# Patient Record
Sex: MALE | Race: WHITE | Employment: PART TIME | URBAN - METROPOLITAN AREA
[De-identification: names, ages, dates, MRNs, and addresses within clinical notes are randomized per-mention and may not be internally consistent; named-entity substitution may affect disease eponyms.]

---

## 2017-08-08 ENCOUNTER — OFFICE VISIT (OUTPATIENT)
Dept: FAMILY MEDICINE CLINIC | Facility: CLINIC | Age: 62
End: 2017-08-08

## 2017-08-08 VITALS
HEART RATE: 67 BPM | DIASTOLIC BLOOD PRESSURE: 70 MMHG | RESPIRATION RATE: 14 BRPM | WEIGHT: 205 LBS | HEIGHT: 72 IN | OXYGEN SATURATION: 98 % | TEMPERATURE: 98 F | SYSTOLIC BLOOD PRESSURE: 110 MMHG | BODY MASS INDEX: 27.77 KG/M2

## 2017-08-08 DIAGNOSIS — H66.92 ACUTE OTITIS MEDIA, LEFT: Primary | ICD-10-CM

## 2017-08-08 DIAGNOSIS — T50.905A ADVERSE DRUG REACTION, INITIAL ENCOUNTER: ICD-10-CM

## 2017-08-08 PROCEDURE — 99202 OFFICE O/P NEW SF 15 MIN: CPT | Performed by: PHYSICIAN ASSISTANT

## 2017-08-08 RX ORDER — AZITHROMYCIN 250 MG/1
TABLET, FILM COATED ORAL
Qty: 6 TABLET | Refills: 0 | Status: SHIPPED | OUTPATIENT
Start: 2017-08-08 | End: 2017-08-29

## 2017-08-08 RX ORDER — AMOXICILLIN AND CLAVULANATE POTASSIUM 875; 125 MG/1; MG/1
TABLET, FILM COATED ORAL
COMMUNITY
Start: 2017-08-04 | End: 2017-08-29

## 2017-08-08 NOTE — PROGRESS NOTES
CHIEF COMPLAINT:   Patient presents with:  Ear Pain: and allergic reaction to Augmentin, last night vomitting, over night 2 hours after taking Augmentin.  Fri started Augmentin     HPI:   Ivonne Nowak is a 65year old male who presents to clinic toda effusion, bony landmarks sharp. NOSE: nostrils patent, no nasal discharge, nasal mucosa pink and noninflamed  THROAT: oral mucosa pink, moist. Posterior pharynx is not erythematous or injected. No exudates.   NECK: supple, non-tender  LUNGS: clear to auscu (Otitis Media)   What is a middle ear infection? A middle ear infection is an infection of the air-filled space in the ear behind the eardrum. Anyone can get an ear infection, but ear infections are more common in children less than 6years old.    How do to help clear the eustachian tube. This may help relieve pressure in the middle ear. For pain take a nonprescription pain reliever such as acetaminophen (Tylenol) or ibuprofen.  Carefully follow the directions for using medicines, even if they are nonprescr even after you take acetaminophen, aspirin, or ibuprofen   a severe headache or worsening pain around the ear   swelling around the ear   increasing dizziness   worsening of your hearing   weakness of one side of your face. Keep all your appointments.  Maryann Osman

## 2017-08-08 NOTE — PATIENT INSTRUCTIONS
Please follow up with your PCP if no improvement within 5-7 days. Go directly to the ER for any acute worsening of symptoms. Discontinue Augmentin  · It is very important to complete full course of antibiotic.    · Acetaminophen or ibuprofen according to not move well. How is it treated? Antibiotic medicine is a common treatment for ear infections. However, recent studies have shown that the symptoms of ear infections often go away in a couple of days without antibiotics.  Bacteria can become resistant of your eardrum. If you have a fever:   Rest until your temperature has fallen below 100°F (37.8°C). Then become as active as is comfortable. Ask your provider if you can take aspirin, acetaminophen, or ibuprofen to control your fever.  Anyone under the

## 2017-08-29 PROBLEM — M54.2 NECK PAIN: Status: ACTIVE | Noted: 2017-08-29

## 2017-08-29 PROBLEM — M54.12 CERVICAL RADICULOPATHY: Status: ACTIVE | Noted: 2017-08-29

## 2017-08-29 PROBLEM — M50.30 DEGENERATIVE CERVICAL DISC: Status: ACTIVE | Noted: 2017-08-29

## 2017-12-08 ENCOUNTER — HOSPITAL ENCOUNTER (OUTPATIENT)
Age: 62
Discharge: HOME | End: 2017-12-08
Payer: COMMERCIAL

## 2017-12-08 ENCOUNTER — OFFICE VISIT (OUTPATIENT)
Dept: FAMILY MEDICINE CLINIC | Facility: CLINIC | Age: 62
End: 2017-12-08

## 2017-12-08 ENCOUNTER — APPOINTMENT (OUTPATIENT)
Dept: CT IMAGING | Age: 62
End: 2017-12-08
Attending: NURSE PRACTITIONER
Payer: COMMERCIAL

## 2017-12-08 VITALS
TEMPERATURE: 98 F | DIASTOLIC BLOOD PRESSURE: 84 MMHG | HEART RATE: 60 BPM | RESPIRATION RATE: 16 BRPM | OXYGEN SATURATION: 98 % | SYSTOLIC BLOOD PRESSURE: 172 MMHG

## 2017-12-08 VITALS
RESPIRATION RATE: 20 BRPM | WEIGHT: 205 LBS | HEART RATE: 54 BPM | BODY MASS INDEX: 27.77 KG/M2 | TEMPERATURE: 97 F | OXYGEN SATURATION: 96 % | SYSTOLIC BLOOD PRESSURE: 180 MMHG | HEIGHT: 72 IN | DIASTOLIC BLOOD PRESSURE: 75 MMHG

## 2017-12-08 DIAGNOSIS — Z02.9 ENCOUNTERS FOR ADMINISTRATIVE PURPOSE: Primary | ICD-10-CM

## 2017-12-08 DIAGNOSIS — R10.31 RIGHT LOWER QUADRANT ABDOMINAL PAIN: ICD-10-CM

## 2017-12-08 DIAGNOSIS — K57.92 ACUTE DIVERTICULITIS: ICD-10-CM

## 2017-12-08 DIAGNOSIS — N13.39 OTHER HYDRONEPHROSIS: Primary | ICD-10-CM

## 2017-12-08 PROCEDURE — 80047 BASIC METABLC PNL IONIZED CA: CPT

## 2017-12-08 PROCEDURE — 74176 CT ABD & PELVIS W/O CONTRAST: CPT | Performed by: NURSE PRACTITIONER

## 2017-12-08 PROCEDURE — 87086 URINE CULTURE/COLONY COUNT: CPT | Performed by: NURSE PRACTITIONER

## 2017-12-08 PROCEDURE — 81002 URINALYSIS NONAUTO W/O SCOPE: CPT | Performed by: NURSE PRACTITIONER

## 2017-12-08 PROCEDURE — 96360 HYDRATION IV INFUSION INIT: CPT

## 2017-12-08 PROCEDURE — 99204 OFFICE O/P NEW MOD 45 MIN: CPT

## 2017-12-08 PROCEDURE — 99214 OFFICE O/P EST MOD 30 MIN: CPT

## 2017-12-08 PROCEDURE — 85025 COMPLETE CBC W/AUTO DIFF WBC: CPT | Performed by: NURSE PRACTITIONER

## 2017-12-08 RX ORDER — CIPROFLOXACIN 500 MG/1
500 TABLET, FILM COATED ORAL 2 TIMES DAILY
Qty: 20 TABLET | Refills: 0 | Status: SHIPPED | OUTPATIENT
Start: 2017-12-08 | End: 2017-12-18

## 2017-12-08 RX ORDER — METRONIDAZOLE 500 MG/1
500 TABLET ORAL 3 TIMES DAILY
Qty: 30 TABLET | Refills: 0 | Status: SHIPPED | OUTPATIENT
Start: 2017-12-08 | End: 2017-12-18

## 2017-12-08 RX ORDER — SODIUM CHLORIDE 9 MG/ML
1000 INJECTION, SOLUTION INTRAVENOUS ONCE
Status: COMPLETED | OUTPATIENT
Start: 2017-12-08 | End: 2017-12-08

## 2017-12-08 NOTE — PROGRESS NOTES
65year old smoker presents with 1 week history of lower abdominal discomfort. Localizes symptoms more to the right lower quadrant and central lower pelvis. Denies fever, N/V.  BM per usual without blood. No abdominal surgical  History.  No urinary symp

## 2017-12-09 NOTE — ED INITIAL ASSESSMENT (HPI)
Patient has had lower right back pain that moves to the front. He was having RLQ pain that is now more of a central lower abdomen pain. No fevers.  No nausea, no vomiting, no history of prostate issues, no history of kidney stones but 2 of his brothers have

## 2017-12-09 NOTE — ED PROVIDER NOTES
Patient Seen in: [id number] [facility]    History   Patient presents with:  Abdomen/Flank Pain (GI/)    Stated Complaint: stomach pain     28-year-old male presents today with complaints of right lower quadrant and inguinal pain.   Pain starte Device: None (Room air)    Current:BP (!) 180/75   Pulse 54   Temp (!) 96.8 °F (36 °C) (Temporal)   Resp 20   Ht 182.9 cm (6')   Wt 93 kg   SpO2 96%   BMI 27.80 kg/m²         Physical Exam   Constitutional: He is oriented to person, place, and time.  He yi Dose reduction techniques were used. Dose information is transmitted to the [facility] [employer]) NRDR ([address]) which includes the Dose Index Registry.   PATIENT STATED HISTORY: (As transcribed by Technologist)  Lucie Patel hydroureter however no stone observed. Skin also shows some mild focal sigmoid diverticulitis. Discussed results with patient. Patient follow-up with urology Dr. Tanya Stallings for further care and evaluation of hydronephrosis.   Patient instructed to follow with p

## 2017-12-20 PROBLEM — I70.0 AORTIC CALCIFICATION (HCC): Status: ACTIVE | Noted: 2017-12-20

## 2017-12-20 PROBLEM — I70.0 AORTIC CALCIFICATION (HCC): Status: RESOLVED | Noted: 2017-12-20 | Resolved: 2017-12-20

## 2017-12-20 PROBLEM — I70.0 CALCIFICATION OF ABDOMINAL AORTA (HCC): Status: ACTIVE | Noted: 2017-12-20

## 2018-01-09 PROBLEM — M47.812 SPONDYLOSIS OF CERVICAL REGION WITHOUT MYELOPATHY OR RADICULOPATHY: Status: ACTIVE | Noted: 2018-01-09

## 2018-01-09 PROBLEM — M54.12 CERVICAL RADICULITIS: Status: ACTIVE | Noted: 2018-01-09

## 2018-01-17 NOTE — PROGRESS NOTES
CC:  Patient presents with:  Smoking: pt follow up on chantix, pt has cut down to 10 cigarettes daily      HPI: Luke Chen presents for a 4-week follow up after starting Chantix, as well as to recheck his previously-elevated BP.  He was seen a week ago in the distress  HENT:  Head: Normocephalic, atraumatic  Ears: Normal external ears  Nose: No edema, bleeding, or rhinorrhea  Eyes: Pupils equal  Cardiovascular: Excellent peripheral perfusion  Pulmonary/Chest: No audible wheezing or labored breathing  Abdominal: Varenicline Tartrate (CHANTIX) 1 MG Oral Tab 60 tablet 1      Sig: Take 1 tablet (1 mg total) by mouth 2 (two) times daily.

## 2018-03-15 NOTE — PROGRESS NOTES
CC:  Patient presents with:  Diverticulitis: follow up after completing abx, stools are still soft and experiencing some minor lower abdomen pain  Blood Pressure: blood pressure has been elevated in the last few weeks    Smoking: ct scan discovered aortic nourished; in no acute distress  HENT:  Head: Normocephalic, atraumatic  Ears: Normal external ears  Nose: No edema, bleeding, or rhinorrhea  Eyes: Pupils equal  Cardiovascular: Excellent peripheral perfusion  Pulmonary/Chest: No audible wheezing or labore Sig: Take 0.5 mg by mouth 2 (two) times daily. Take 0.5 mg twice daily days 1-7, then 1 mg twice daily after that. Airway patent, Nasal mucosa clear. Mouth with normal mucosa. Throat has no vesicles, no oropharyngeal exudates and uvula is midline.

## 2018-04-02 ENCOUNTER — OFFICE VISIT (OUTPATIENT)
Dept: FAMILY MEDICINE CLINIC | Facility: CLINIC | Age: 63
End: 2018-04-02

## 2018-04-02 VITALS
TEMPERATURE: 99 F | WEIGHT: 217 LBS | RESPIRATION RATE: 16 BRPM | HEIGHT: 71.5 IN | HEART RATE: 84 BPM | DIASTOLIC BLOOD PRESSURE: 70 MMHG | BODY MASS INDEX: 29.72 KG/M2 | SYSTOLIC BLOOD PRESSURE: 120 MMHG

## 2018-04-02 DIAGNOSIS — Z71.6 ENCOUNTER FOR SMOKING CESSATION COUNSELING: ICD-10-CM

## 2018-04-02 DIAGNOSIS — Z00.00 ROUTINE GENERAL MEDICAL EXAMINATION AT A HEALTH CARE FACILITY: Primary | ICD-10-CM

## 2018-04-02 PROCEDURE — 99396 PREV VISIT EST AGE 40-64: CPT | Performed by: PHYSICIAN ASSISTANT

## 2018-04-02 NOTE — PROGRESS NOTES
Bettyjane Dakin is a 65year old male who presents for a complete physical exam. He has no complaints. He has a home exercise program for his left shoulder pain following PT, but is doing well otherwise.  He has cut down to no cigarettes somedays, and 1-2 on his wo palpitations, tachycardia, or arrythmias  GI: Denies abdominal pain, constipation or diarrhea; no N/V  : Denies nocturia or changes in stream; no testicular pain, edema, or lumps  Neuro: Denies headaches; dizziness, syncope; no weakness, numbness, or tin

## 2018-04-16 RX ORDER — VARENICLINE TARTRATE 1 MG/1
1 TABLET, FILM COATED ORAL 2 TIMES DAILY
Qty: 60 TABLET | Refills: 1 | Status: SHIPPED | OUTPATIENT
Start: 2018-04-16 | End: 2018-06-18

## 2018-04-16 NOTE — TELEPHONE ENCOUNTER
Spoke to pt who's requesting refill on Varenicline Tartrate (CHANTIX) 1 MG Oral be sent to [address] [place] [address]. Pt will be out of meds on Wed 4/18/18 and will be out of town on Thursday, 4/19/18.

## 2018-04-23 ENCOUNTER — HOSPITAL ENCOUNTER (OUTPATIENT)
Dept: CT IMAGING | Facility: HOSPITAL | Age: 63
Discharge: HOME | End: 2018-04-23
Attending: STUDENT IN AN ORGANIZED HEALTH CARE EDUCATION/TRAINING PROGRAM
Payer: COMMERCIAL

## 2018-04-23 DIAGNOSIS — K64.9 RECTAL VARICES: ICD-10-CM

## 2018-04-23 PROCEDURE — 74177 CT ABD & PELVIS W/CONTRAST: CPT | Performed by: STUDENT IN AN ORGANIZED HEALTH CARE EDUCATION/TRAINING PROGRAM

## 2018-04-23 PROCEDURE — 82565 ASSAY OF CREATININE: CPT

## 2018-05-07 ENCOUNTER — TELEPHONE (OUTPATIENT)
Dept: FAMILY MEDICINE CLINIC | Facility: CLINIC | Age: 63
End: 2018-05-07

## 2018-05-07 DIAGNOSIS — Z13.220 SCREENING, LIPID: ICD-10-CM

## 2018-05-07 DIAGNOSIS — Z12.5 SCREENING PSA (PROSTATE SPECIFIC ANTIGEN): ICD-10-CM

## 2018-05-07 DIAGNOSIS — Z13.29 SCREENING FOR ENDOCRINE, METABOLIC AND IMMUNITY DISORDER: Primary | ICD-10-CM

## 2018-05-07 DIAGNOSIS — Z13.0 SCREENING FOR ENDOCRINE, METABOLIC AND IMMUNITY DISORDER: Primary | ICD-10-CM

## 2018-05-07 DIAGNOSIS — Z13.228 SCREENING FOR ENDOCRINE, METABOLIC AND IMMUNITY DISORDER: Primary | ICD-10-CM

## 2018-05-07 NOTE — TELEPHONE ENCOUNTER
Patient would like labs that were placed by Earline Ramey on 1/17/18 to be changed to [facility] facility and reentered so that he can get labs drawn.

## 2018-06-15 ENCOUNTER — TELEPHONE (OUTPATIENT)
Dept: FAMILY MEDICINE CLINIC | Facility: CLINIC | Age: 63
End: 2018-06-15

## 2018-06-15 PROBLEM — M54.12 CERVICAL RADICULOPATHY: Status: RESOLVED | Noted: 2017-08-29 | Resolved: 2018-06-15

## 2018-06-15 PROBLEM — M54.2 NECK PAIN: Status: RESOLVED | Noted: 2017-08-29 | Resolved: 2018-06-15

## 2018-06-15 PROBLEM — M50.30 DEGENERATIVE CERVICAL DISC: Status: RESOLVED | Noted: 2017-08-29 | Resolved: 2018-06-15

## 2018-06-15 PROBLEM — E78.2 MIXED HYPERLIPIDEMIA: Status: ACTIVE | Noted: 2018-06-15

## 2018-06-16 NOTE — ASSESSMENT & PLAN NOTE
start statin, will get labs in 3 months. Will follow. Risks and benefits of medication discussed and alternatives discussed. Patient wishes to proceed with treatment.

## 2018-06-18 ENCOUNTER — OFFICE VISIT (OUTPATIENT)
Dept: FAMILY MEDICINE CLINIC | Facility: CLINIC | Age: 63
End: 2018-06-18

## 2018-06-18 VITALS
TEMPERATURE: 98 F | HEIGHT: 71 IN | HEART RATE: 64 BPM | SYSTOLIC BLOOD PRESSURE: 138 MMHG | BODY MASS INDEX: 30.1 KG/M2 | RESPIRATION RATE: 14 BRPM | WEIGHT: 215 LBS | DIASTOLIC BLOOD PRESSURE: 70 MMHG

## 2018-06-18 DIAGNOSIS — R73.9 HYPERGLYCEMIA: ICD-10-CM

## 2018-06-18 DIAGNOSIS — E78.2 MIXED HYPERLIPIDEMIA: ICD-10-CM

## 2018-06-18 DIAGNOSIS — F17.200 TOBACCO USE DISORDER: ICD-10-CM

## 2018-06-18 DIAGNOSIS — I70.0 CALCIFICATION OF ABDOMINAL AORTA (HCC): Primary | ICD-10-CM

## 2018-06-18 PROCEDURE — 99213 OFFICE O/P EST LOW 20 MIN: CPT | Performed by: FAMILY MEDICINE

## 2018-06-18 PROCEDURE — 99407 BEHAV CHNG SMOKING > 10 MIN: CPT | Performed by: FAMILY MEDICINE

## 2018-06-18 RX ORDER — ROSUVASTATIN CALCIUM 10 MG/1
10 TABLET, COATED ORAL NIGHTLY
Qty: 90 TABLET | Refills: 3 | Status: SHIPPED | OUTPATIENT
Start: 2018-06-18 | End: 2019-06-23

## 2018-06-18 RX ORDER — VARENICLINE TARTRATE 1 MG/1
1 TABLET, FILM COATED ORAL 2 TIMES DAILY
Qty: 120 TABLET | Refills: 1 | Status: SHIPPED | OUTPATIENT
Start: 2018-06-18 | End: 2018-10-25

## 2018-06-18 RX ORDER — GABAPENTIN 300 MG/1
CAPSULE ORAL
COMMUNITY
Start: 2018-06-02 | End: 2018-12-19

## 2018-06-18 NOTE — PROGRESS NOTES
HPI:   Patient presents with:  Test Results: blood work done 5/28/2018      Pk Sanchez is a 59year old male who presents for recheck of his hypertension. He has been taking medications as instructed, with no medication side effects.  His home BP mon shoulder; Calcification of abdominal aorta ([address] Cervical radiculitis; Spondylosis of cervical region without myelopathy or radiculopathy; and Mixed hyperlipidemia on his problem list.   He  has a past surgical history that includes tonsillectomy.    His f Mixed hyperlipidemia    Overview     Low HDL and elevated LDL         Current Assessment & Plan     Stable, Continue present management. Cutting back on smoking,   Return in about 6 months (around 12/18/2018).     Mallory Pace, 6/18/2018

## 2018-06-18 NOTE — PATIENT INSTRUCTIONS
No visits with results within 1 Month(s) from this visit.    Latest known visit with results is:   Telephone on 05/07/2018   Component Date Value Ref Range Status   • GLUCOSE 05/29/2018 106* 65 - 99 mg/dL Final    Comment:               Fasting reference in diabetic patients   with > or = 2 CHD risk factors. LDL-C is now calculated using the Manny-Moyer   calculation, which is a validated novel method providing   better accuracy than the Friedewald equation in the   estimation of LDL-C.    Garret Montgomery et

## 2018-10-25 RX ORDER — VARENICLINE TARTRATE 1 MG/1
1 TABLET, FILM COATED ORAL 2 TIMES DAILY
Qty: 120 TABLET | Refills: 1 | Status: SHIPPED | OUTPATIENT
Start: 2018-10-25 | End: 2019-10-28

## 2018-10-25 NOTE — TELEPHONE ENCOUNTER
Request for refill Varenicline 1mg, 1 tab bid from [facility].    Last ref 6/18/18 and 9/22/18.   LOV 4/02/18 for general med exam with JAILENE Torre    Pended refill routed to Dr Lashonda Saez

## 2018-12-10 ENCOUNTER — OFFICE VISIT (OUTPATIENT)
Dept: FAMILY MEDICINE CLINIC | Facility: CLINIC | Age: 63
End: 2018-12-10
Payer: COMMERCIAL

## 2018-12-10 VITALS
SYSTOLIC BLOOD PRESSURE: 150 MMHG | OXYGEN SATURATION: 98 % | DIASTOLIC BLOOD PRESSURE: 84 MMHG | HEART RATE: 60 BPM | TEMPERATURE: 98 F | RESPIRATION RATE: 16 BRPM

## 2018-12-10 DIAGNOSIS — Z23 NEED FOR VACCINATION: ICD-10-CM

## 2018-12-10 DIAGNOSIS — H11.32 SUBCONJUNCTIVAL HEMORRHAGE OF LEFT EYE: Primary | ICD-10-CM

## 2018-12-10 PROCEDURE — 90686 IIV4 VACC NO PRSV 0.5 ML IM: CPT | Performed by: PHYSICIAN ASSISTANT

## 2018-12-10 PROCEDURE — 90471 IMMUNIZATION ADMIN: CPT | Performed by: PHYSICIAN ASSISTANT

## 2018-12-10 PROCEDURE — 99213 OFFICE O/P EST LOW 20 MIN: CPT | Performed by: PHYSICIAN ASSISTANT

## 2018-12-10 NOTE — PROGRESS NOTES
CHIEF COMPLAINT:   Patient presents with:  Eye Problem: left eye noted to be red. Imm/Inj: request flu shot. HPI:   Carlos Polo is a 59year old male who presents with chief complaint of  Left eye is red.   Symptoms first noted about 20 minute 35.00        Pack years: 3.5        Types: Cigarettes      Smokeless tobacco: Never Used      Tobacco comment: occssionally one or two    Alcohol use:  Yes      Alcohol/week: 6.0 oz      Types: 10 Glasses of wine per week      Comment: red wine    Drug use: side effects of meds discussed. Contagion measures reviewed. Warm compresses to affected eye prn. Call with developing eye pain, photophobia, VA changes. Can return to work/school after on medication for 24 hours.     Call or return if not impro

## 2018-12-10 NOTE — PATIENT INSTRUCTIONS
Subconjunctival Hemorrhage    A subconjunctival hemorrhage is a result of a broken blood vessel in the white part of the eye. It is usually painless and may be caused by coughing, sneezing, or vomiting. An injury to the eye can cause this.  It can also be

## 2018-12-19 ENCOUNTER — OFFICE VISIT (OUTPATIENT)
Dept: FAMILY MEDICINE CLINIC | Facility: CLINIC | Age: 63
End: 2018-12-19
Payer: COMMERCIAL

## 2018-12-19 VITALS
HEIGHT: 71 IN | BODY MASS INDEX: 30.52 KG/M2 | SYSTOLIC BLOOD PRESSURE: 134 MMHG | RESPIRATION RATE: 16 BRPM | WEIGHT: 218 LBS | TEMPERATURE: 98 F | DIASTOLIC BLOOD PRESSURE: 70 MMHG | HEART RATE: 60 BPM

## 2018-12-19 DIAGNOSIS — M79.642 PAIN IN BOTH HANDS: ICD-10-CM

## 2018-12-19 DIAGNOSIS — M79.641 PAIN IN BOTH HANDS: ICD-10-CM

## 2018-12-19 DIAGNOSIS — E78.2 MIXED HYPERLIPIDEMIA: Primary | ICD-10-CM

## 2018-12-19 DIAGNOSIS — R73.9 HYPERGLYCEMIA: ICD-10-CM

## 2018-12-19 PROCEDURE — 99214 OFFICE O/P EST MOD 30 MIN: CPT | Performed by: FAMILY MEDICINE

## 2018-12-19 NOTE — PROGRESS NOTES
HPI:   Patient presents with:  Cholesterol: 6 month f/u, pt states Crestor has been causing pain    Subjective   Daniel Andrea is a 59year old male who presents for recheck of his hypertension.  He has been taking medications as instructed, with no med HENT:   Head: Normocephalic. Neck: Normal range of motion. Neck supple. Cardiovascular: Normal rate, regular rhythm and normal heart sounds. No murmur heard. Pulmonary/Chest: Effort normal and breath sounds normal. No respiratory distress.    Jake Cheema

## 2019-06-23 DIAGNOSIS — E78.2 MIXED HYPERLIPIDEMIA: ICD-10-CM

## 2019-06-25 NOTE — TELEPHONE ENCOUNTER
Requested Prescriptions     Pending Prescriptions Disp Refills   • ROSUVASTATIN CALCIUM 10 MG Oral Tab [Pharmacy Med Name: Rosuvastatin Calcium 10 Mg Tab Bioc] 30 tablet 2     Sig: TAKE 1 TABLET BY MOUTH NIGHTLY     LOV 12/19/2018     Patient was asked to

## 2019-06-26 RX ORDER — ROSUVASTATIN CALCIUM 10 MG/1
TABLET, COATED ORAL
Qty: 30 TABLET | Refills: 0 | Status: SHIPPED | OUTPATIENT
Start: 2019-06-26 | End: 2019-07-22

## 2019-06-26 NOTE — TELEPHONE ENCOUNTER
Patient called back and scheduled appointment for 07/22/19 with Dr. Deon Covington.  Patient will run out of medication prior to appointment, please send refill to pharmacy

## 2019-06-26 NOTE — TELEPHONE ENCOUNTER
LMOM for pt to call back to scheduled an appt to get med refill (once appt has been made ask he has enough)

## 2019-07-17 LAB
ALBUMIN/GLOBULIN RATIO: 1.7 (CALC) (ref 1–2.5)
ALBUMIN: 4 G/DL (ref 3.6–5.1)
ALKALINE PHOSPHATASE: 114 U/L (ref 40–115)
ALT: 14 U/L (ref 9–46)
ANA SCREEN, IFA: NEGATIVE
AST: 14 U/L (ref 10–35)
BILIRUBIN, TOTAL: 0.5 MG/DL (ref 0.2–1.2)
BUN/CREATININE RATIO: 19 (CALC) (ref 6–22)
BUN: 13 MG/DL (ref 7–25)
C-REACTIVE PROTEIN: 6.4 MG/L
CALCIUM: 8.8 MG/DL (ref 8.6–10.3)
CARBON DIOXIDE: 27 MMOL/L (ref 20–32)
CHLORIDE: 107 MMOL/L (ref 98–110)
CHOL/HDLC RATIO: 4.2 (CALC)
CHOLESTEROL, TOTAL: 176 MG/DL
CREATININE: 0.68 MG/DL (ref 0.7–1.25)
CYCLIC CITRULLINATED$PEPTIDE (CCP) AB (IGG): <16 UNITS
EGFR IF AFRICN AM: 118 ML/MIN/1.73M2
EGFR IF NONAFRICN AM: 102 ML/MIN/1.73M2
GLOBULIN: 2.4 G/DL (CALC) (ref 1.9–3.7)
GLUCOSE: 116 MG/DL (ref 65–99)
HDL CHOLESTEROL: 42 MG/DL
HEMATOCRIT: 42.6 % (ref 38.5–50)
HEMOGLOBIN A1C: 5.6 % OF TOTAL HGB
HEMOGLOBIN: 14.6 G/DL (ref 13.2–17.1)
LDL-CHOLESTEROL: 111 MG/DL (CALC)
MCH: 32.3 PG (ref 27–33)
MCHC: 34.3 G/DL (ref 32–36)
MCV: 94.2 FL (ref 80–100)
MPV: 11 FL (ref 7.5–12.5)
NON-HDL CHOLESTEROL: 134 MG/DL (CALC)
PLATELET COUNT: 175 THOUSAND/UL (ref 140–400)
POTASSIUM: 4.2 MMOL/L (ref 3.5–5.3)
PROTEIN, TOTAL: 6.4 G/DL (ref 6.1–8.1)
RDW: 12.5 % (ref 11–15)
RED BLOOD CELL COUNT: 4.52 MILLION/UL (ref 4.2–5.8)
RHEUMATOID FACTOR: <14 IU/ML
SED RATE BY MODIFIED$WESTERGREN: 9 MM/H
SODIUM: 141 MMOL/L (ref 135–146)
TRIGLYCERIDES: 119 MG/DL
WHITE BLOOD CELL COUNT: 7.6 THOUSAND/UL (ref 3.8–10.8)

## 2019-07-22 ENCOUNTER — OFFICE VISIT (OUTPATIENT)
Dept: FAMILY MEDICINE CLINIC | Facility: CLINIC | Age: 64
End: 2019-07-22
Payer: COMMERCIAL

## 2019-07-22 VITALS
WEIGHT: 213.38 LBS | SYSTOLIC BLOOD PRESSURE: 126 MMHG | TEMPERATURE: 97 F | HEIGHT: 71.75 IN | BODY MASS INDEX: 29.22 KG/M2 | RESPIRATION RATE: 12 BRPM | DIASTOLIC BLOOD PRESSURE: 80 MMHG | HEART RATE: 60 BPM

## 2019-07-22 DIAGNOSIS — Z11.59 ENCOUNTER FOR HEPATITIS C SCREENING TEST FOR LOW RISK PATIENT: ICD-10-CM

## 2019-07-22 DIAGNOSIS — F17.200 TOBACCO USE DISORDER: ICD-10-CM

## 2019-07-22 DIAGNOSIS — E78.2 MIXED HYPERLIPIDEMIA: Primary | ICD-10-CM

## 2019-07-22 DIAGNOSIS — R73.9 HYPERGLYCEMIA: ICD-10-CM

## 2019-07-22 DIAGNOSIS — Z00.00 LABORATORY EXAMINATION ORDERED AS PART OF A ROUTINE GENERAL MEDICAL EXAMINATION: ICD-10-CM

## 2019-07-22 DIAGNOSIS — I70.0 CALCIFICATION OF ABDOMINAL AORTA (HCC): ICD-10-CM

## 2019-07-22 PROCEDURE — 99406 BEHAV CHNG SMOKING 3-10 MIN: CPT | Performed by: FAMILY MEDICINE

## 2019-07-22 PROCEDURE — 99214 OFFICE O/P EST MOD 30 MIN: CPT | Performed by: FAMILY MEDICINE

## 2019-07-22 RX ORDER — ROSUVASTATIN CALCIUM 10 MG/1
10 TABLET, COATED ORAL DAILY
Qty: 90 TABLET | Refills: 3 | Status: SHIPPED | OUTPATIENT
Start: 2019-07-22 | End: 2020-01-22

## 2019-07-22 RX ORDER — VARENICLINE TARTRATE 0.5 MG/1
0.5 TABLET, FILM COATED ORAL 2 TIMES DAILY
COMMUNITY
End: 2019-10-30

## 2019-07-22 NOTE — PROGRESS NOTES
HPI:   Patient presents with:  Cholesterol: medication f/u     Subjective   Zeus Aguilar is a 62year old male who presents for recheck of his hypertension. He has been taking medications as instructed, with no medication side effects.  His home BP mon Neck: Normal range of motion. Cardiovascular: Normal rate, regular rhythm, S1 normal, S2 normal and normal heart sounds. No murmur heard. Pulses:       Posterior tibial pulses are 2+ on the right side, and 2+ on the left side. Edema not present. NO DIFFERENTIAL    Encounter for hepatitis C screening test for low risk patient        Relevant Orders    HCV ANTIBODY    Hyperglycemia        Relevant Orders    HEMOGLOBIN A1C           Return in about 6 months (around 1/22/2020) for Annual physical.

## 2019-10-22 ENCOUNTER — OFFICE VISIT (OUTPATIENT)
Dept: FAMILY MEDICINE CLINIC | Facility: CLINIC | Age: 64
End: 2019-10-22
Payer: COMMERCIAL

## 2019-10-22 DIAGNOSIS — L02.31 ABSCESS OF BUTTOCK, RIGHT: Primary | ICD-10-CM

## 2019-10-22 PROCEDURE — 99213 OFFICE O/P EST LOW 20 MIN: CPT | Performed by: NURSE PRACTITIONER

## 2019-10-22 RX ORDER — DOXYCYCLINE HYCLATE 100 MG
100 TABLET ORAL 2 TIMES DAILY
Qty: 20 TABLET | Refills: 0 | Status: SHIPPED | OUTPATIENT
Start: 2019-10-22 | End: 2019-11-01

## 2019-10-22 NOTE — PATIENT INSTRUCTIONS
Abscess (Antibiotic Treatment Only)  An abscess (sometimes called a “boil”) happens when bacteria get trapped under the skin and start to grow. Pus forms inside the abscess as the body responds to the bacteria.  An abscess can happen with an insect bite, © 9305-7813 [employer] [address] [facility], [facility] [place]. All rights reserved. This information is not intended as a substitute for professional medical care. Always follow your healthcare professional's instructions.

## 2019-10-25 ENCOUNTER — OFFICE VISIT (OUTPATIENT)
Dept: FAMILY MEDICINE CLINIC | Facility: CLINIC | Age: 64
End: 2019-10-25
Payer: COMMERCIAL

## 2019-10-25 ENCOUNTER — APPOINTMENT (OUTPATIENT)
Dept: CT IMAGING | Facility: HOSPITAL | Age: 64
End: 2019-10-25
Attending: PHYSICIAN ASSISTANT
Payer: COMMERCIAL

## 2019-10-25 ENCOUNTER — HOSPITAL ENCOUNTER (EMERGENCY)
Facility: HOSPITAL | Age: 64
Discharge: HOME | End: 2019-10-25
Attending: EMERGENCY MEDICINE
Payer: COMMERCIAL

## 2019-10-25 VITALS — BODY MASS INDEX: 30 KG/M2 | WEIGHT: 220 LBS | TEMPERATURE: 98 F

## 2019-10-25 VITALS
SYSTOLIC BLOOD PRESSURE: 122 MMHG | DIASTOLIC BLOOD PRESSURE: 80 MMHG | RESPIRATION RATE: 16 BRPM | TEMPERATURE: 98 F | HEART RATE: 76 BPM

## 2019-10-25 VITALS
WEIGHT: 210 LBS | RESPIRATION RATE: 16 BRPM | OXYGEN SATURATION: 97 % | TEMPERATURE: 98 F | BODY MASS INDEX: 29.4 KG/M2 | DIASTOLIC BLOOD PRESSURE: 85 MMHG | SYSTOLIC BLOOD PRESSURE: 171 MMHG | HEART RATE: 83 BPM | HEIGHT: 71 IN

## 2019-10-25 DIAGNOSIS — Z02.9 ENCOUNTERS FOR UNSPECIFIED ADMINISTRATIVE PURPOSE: Primary | ICD-10-CM

## 2019-10-25 DIAGNOSIS — K61.0 PERIANAL ABSCESS: Primary | ICD-10-CM

## 2019-10-25 PROCEDURE — 87147 CULTURE TYPE IMMUNOLOGIC: CPT | Performed by: PHYSICIAN ASSISTANT

## 2019-10-25 PROCEDURE — 46050 I&D PERIANAL ABSCESS SUPFC: CPT

## 2019-10-25 PROCEDURE — 87186 SC STD MICRODIL/AGAR DIL: CPT | Performed by: PHYSICIAN ASSISTANT

## 2019-10-25 PROCEDURE — 85025 COMPLETE CBC W/AUTO DIFF WBC: CPT | Performed by: PHYSICIAN ASSISTANT

## 2019-10-25 PROCEDURE — 99284 EMERGENCY DEPT VISIT MOD MDM: CPT

## 2019-10-25 PROCEDURE — 87070 CULTURE OTHR SPECIMN AEROBIC: CPT | Performed by: PHYSICIAN ASSISTANT

## 2019-10-25 PROCEDURE — 80053 COMPREHEN METABOLIC PANEL: CPT | Performed by: PHYSICIAN ASSISTANT

## 2019-10-25 PROCEDURE — 87205 SMEAR GRAM STAIN: CPT | Performed by: PHYSICIAN ASSISTANT

## 2019-10-25 PROCEDURE — 36415 COLL VENOUS BLD VENIPUNCTURE: CPT

## 2019-10-25 PROCEDURE — 74177 CT ABD & PELVIS W/CONTRAST: CPT | Performed by: PHYSICIAN ASSISTANT

## 2019-10-25 PROCEDURE — 87077 CULTURE AEROBIC IDENTIFY: CPT | Performed by: PHYSICIAN ASSISTANT

## 2019-10-25 RX ORDER — KETOROLAC TROMETHAMINE 30 MG/ML
30 INJECTION, SOLUTION INTRAMUSCULAR; INTRAVENOUS ONCE
Status: DISCONTINUED | OUTPATIENT
Start: 2019-10-25 | End: 2019-10-25

## 2019-10-25 RX ORDER — CLINDAMYCIN HYDROCHLORIDE 300 MG/1
300 CAPSULE ORAL 3 TIMES DAILY
Qty: 30 CAPSULE | Refills: 0 | Status: SHIPPED | OUTPATIENT
Start: 2019-10-25 | End: 2019-11-04

## 2019-10-25 RX ORDER — CLINDAMYCIN HYDROCHLORIDE 300 MG/1
300 CAPSULE ORAL 3 TIMES DAILY
Qty: 30 CAPSULE | Refills: 0 | Status: SHIPPED | OUTPATIENT
Start: 2019-10-25 | End: 2019-10-25

## 2019-10-25 NOTE — ED INITIAL ASSESSMENT (HPI)
Sent from [facility] for rectal abscess that will need an I&D and possible CT scan- ETA 1300

## 2019-10-25 NOTE — PROGRESS NOTES
Presents for right perianal abscess. 9 days duration. Seen in [facility] 3  Days ago and started doxycyline but reports enlargement and increased discomfort in past 2 days. Feels chilled but uncertain if fevers. No drainage.   Well appearing male, afebrile, 5

## 2019-10-25 NOTE — ED PROVIDER NOTES
I reviewed that chart and discussed the case. I have examined the patient and noted perianal/perirectal redness and swelling over the past few days, given doxycycline 2 days ago without improvement.   Sent from walk-in clinic for probable CT to rule out a

## 2019-10-25 NOTE — ED PROVIDER NOTES
Patient Seen in: [facility] Emergency Department      History   Patient presents with:  Abscess (integumentary)    Stated Complaint: rectal abscess     HPI    Sayra Trejo is a 60-year-old male who presents today for evaluation of a rectal abscess.   He d Temp src Temporal   SpO2 96 %   O2 Device None (Room air)       Current:BP (!) 171/85   Pulse 83   Temp 97.5 °F (36.4 °C) (Temporal)   Resp 16   Ht 180.3 cm (5' 11\")   Wt 95.3 kg   SpO2 97%   BMI 29.29 kg/m²         Physical Exam  Nursing note reviewed. Please view results for these tests on the individual orders.    RAINBOW DRAW BLUE   RAINBOW DRAW LAVENDER   RAINBOW DRAW LIGHT GREEN   RAINBOW DRAW GOLD       ED Course as of Oct 25 1557  ------------------------------------------------------------ stone within the left kidney. AORTA/VASCULAR:  Scattered atherosclerosis. Ectatic infrarenal abdominal aorta measuring 2.9 cm. RETROPERITONEUM:  Unremarkable. BOWEL/MESENTERY:  Extensive colonic diverticulosis. ABDOMINAL WALL:  Unremarkable.  PELVIC ORGANS return if any concerning symptoms arise. Additional verbal discharge instructions are given and discussed. Discharge medications are discussed. The patient is in good condition throughout the visit today and remains so upon discharge.   I discuss the plan

## 2019-10-25 NOTE — ED NOTES
Pt is angry and rude to RN. Pt states Jazz Young are you doing a cat scan instead of an ultrasound, because it costs more money? \"

## 2019-10-25 NOTE — PROGRESS NOTES
CHIEF COMPLAINT:   Patient presents with:  Abscess     HPI:    Dennie Leriche is a 62year old male who presents for evaluation of an abscess. Pt states that he noticed the tender, firm area near his rectum 2 days ago.   The area is becoming larger and HEENT: Denies rhinorrhea, edema of the lips or swelling of throat. CARDIOVASCULAR: Denies chest pains or palpitations. LUNGS: Denies shortness of breath with exertion or rest. No cough or wheezing. LYMPH: Denies enlargement of the lymph nodes.     EXAM: In the early stages, your wound may be red and tender. For this stage, you may get antibiotics. If the abscess does not get better with antibiotics, it will need to be drained with a small cut.   Home care  These tips will help you care for your abscess at

## 2019-10-25 NOTE — ED NOTES
Prior to the patient leaving, I meant to review the other incidental findings on the CT scan or found today. 1 of these was an ectatic aorta which measures 2.9 cm and requires follow-up. The patient is a  male with smoking history.   I called him

## 2019-10-27 ENCOUNTER — HOSPITAL ENCOUNTER (OUTPATIENT)
Age: 64
Discharge: HOME | End: 2019-10-27
Payer: COMMERCIAL

## 2019-10-27 VITALS
RESPIRATION RATE: 16 BRPM | OXYGEN SATURATION: 97 % | SYSTOLIC BLOOD PRESSURE: 140 MMHG | DIASTOLIC BLOOD PRESSURE: 77 MMHG | TEMPERATURE: 98 F | HEART RATE: 60 BPM

## 2019-10-27 DIAGNOSIS — Z51.89 WOUND CHECK, ABSCESS: Primary | ICD-10-CM

## 2019-10-27 PROCEDURE — 99211 OFF/OP EST MAY X REQ PHY/QHP: CPT

## 2019-10-27 NOTE — ED INITIAL ASSESSMENT (HPI)
Pt seen in ER on Friday for perianal abscess. Area was drained and packed. Patient placed on abx, which he continues to take.

## 2019-10-27 NOTE — ED PROVIDER NOTES
Patient Seen in: THE [facility] [place]      History   Patient presents with:  Wound Recheck    Stated Complaint: Wound Recheck    HPI  62year old male presents for wound check for perianal abscess wound check that was I and D'd 2 days ago in t Current:/77   Pulse 60   Temp 97.9 °F (36.6 °C) (Temporal)   Resp 16   SpO2 97%         Physical Exam  Vitals signs and nursing note reviewed. Constitutional:       Appearance: He is well-developed. HENT:      Head: Normocephalic and atraumatic.

## 2019-10-29 ENCOUNTER — TELEPHONE (OUTPATIENT)
Dept: FAMILY MEDICINE CLINIC | Facility: CLINIC | Age: 64
End: 2019-10-29

## 2019-10-29 RX ORDER — SULFAMETHOXAZOLE AND TRIMETHOPRIM 800; 160 MG/1; MG/1
1 TABLET ORAL 2 TIMES DAILY
Qty: 14 TABLET | Refills: 0 | Status: SHIPPED | OUTPATIENT
Start: 2019-10-29 | End: 2019-11-05

## 2019-10-29 NOTE — TELEPHONE ENCOUNTER
Called patient after they were in the ER on 10/25/2019 for a rectal abscess. Patient said everything is fine and that he would give us a call back if he needed a follow up appointment.

## 2019-10-30 RX ORDER — VARENICLINE TARTRATE 1 MG/1
TABLET, FILM COATED ORAL
Qty: 60 TABLET | Refills: 3 | Status: SHIPPED | OUTPATIENT
Start: 2019-10-30 | End: 2020-03-03

## 2020-01-22 ENCOUNTER — OFFICE VISIT (OUTPATIENT)
Dept: FAMILY MEDICINE CLINIC | Facility: CLINIC | Age: 65
End: 2020-01-22
Payer: COMMERCIAL

## 2020-01-22 VITALS
WEIGHT: 220 LBS | HEIGHT: 71.75 IN | BODY MASS INDEX: 30.13 KG/M2 | RESPIRATION RATE: 14 BRPM | DIASTOLIC BLOOD PRESSURE: 82 MMHG | HEART RATE: 68 BPM | SYSTOLIC BLOOD PRESSURE: 130 MMHG | TEMPERATURE: 98 F

## 2020-01-22 DIAGNOSIS — I70.0 CALCIFICATION OF ABDOMINAL AORTA (HCC): ICD-10-CM

## 2020-01-22 DIAGNOSIS — E78.2 MIXED HYPERLIPIDEMIA: Primary | ICD-10-CM

## 2020-01-22 DIAGNOSIS — K43.9 EPIGASTRIC HERNIA: ICD-10-CM

## 2020-01-22 PROCEDURE — 99214 OFFICE O/P EST MOD 30 MIN: CPT | Performed by: FAMILY MEDICINE

## 2020-01-22 RX ORDER — ATORVASTATIN CALCIUM 40 MG/1
40 TABLET, FILM COATED ORAL NIGHTLY
Qty: 90 TABLET | Refills: 1 | Status: SHIPPED | OUTPATIENT
Start: 2020-01-22 | End: 2020-07-09

## 2020-01-22 NOTE — PROGRESS NOTES
HPI:   Patient presents with:  Hyperlipidemia: f/u     Subjective   Veronica Ramires is a 62year old male who presents for recheck of his hypertension. He has been taking medications as instructed, with no medication side effects.  His home BP monitoring distress. HENT:   Head: Normocephalic. Neck: Normal range of motion. Cardiovascular: Normal rate, regular rhythm, S1 normal, S2 normal and normal heart sounds. No murmur heard.   Pulses:       Posterior tibial pulses are 2+ on the right side and 2+

## 2020-01-22 NOTE — ASSESSMENT & PLAN NOTE
Stable overall, reassess soon. greater than 25 minutes spent on this  and more than 50 % of that time spent counseling on long term care and follow up and prevention of complications.

## 2020-03-03 RX ORDER — VARENICLINE TARTRATE 1 MG/1
TABLET, FILM COATED ORAL
Qty: 60 TABLET | Refills: 4 | Status: SHIPPED | OUTPATIENT
Start: 2020-03-03 | End: 2020-08-12

## 2020-03-03 NOTE — TELEPHONE ENCOUNTER
Refill request for:    Requested Prescriptions     Pending Prescriptions Disp Refills   • CHANTIX 1 MG Oral Tab [Pharmacy Med Name: Chantix 1 Mg Tab Pfiz] 60 tablet 0     Sig: TAKE ONE TABLET BY MOUTH TWICE DAILY        Last Prescribed Quantity Refills   1

## 2020-07-09 ENCOUNTER — OFFICE VISIT (OUTPATIENT)
Dept: FAMILY MEDICINE CLINIC | Facility: CLINIC | Age: 65
End: 2020-07-09
Payer: COMMERCIAL

## 2020-07-09 VITALS
HEART RATE: 52 BPM | HEIGHT: 71.75 IN | WEIGHT: 207.19 LBS | BODY MASS INDEX: 28.37 KG/M2 | SYSTOLIC BLOOD PRESSURE: 136 MMHG | RESPIRATION RATE: 16 BRPM | TEMPERATURE: 98 F | DIASTOLIC BLOOD PRESSURE: 86 MMHG

## 2020-07-09 DIAGNOSIS — I70.0 CALCIFICATION OF ABDOMINAL AORTA (HCC): ICD-10-CM

## 2020-07-09 DIAGNOSIS — Z00.00 LABORATORY EXAMINATION ORDERED AS PART OF A ROUTINE GENERAL MEDICAL EXAMINATION: ICD-10-CM

## 2020-07-09 DIAGNOSIS — E78.2 MIXED HYPERLIPIDEMIA: ICD-10-CM

## 2020-07-09 DIAGNOSIS — Z00.00 ANNUAL PHYSICAL EXAM: Primary | ICD-10-CM

## 2020-07-09 DIAGNOSIS — Z12.5 SCREENING FOR PROSTATE CANCER: ICD-10-CM

## 2020-07-09 PROCEDURE — 90750 HZV VACC RECOMBINANT IM: CPT | Performed by: FAMILY MEDICINE

## 2020-07-09 PROCEDURE — 90471 IMMUNIZATION ADMIN: CPT | Performed by: FAMILY MEDICINE

## 2020-07-09 PROCEDURE — 99396 PREV VISIT EST AGE 40-64: CPT | Performed by: FAMILY MEDICINE

## 2020-07-09 PROCEDURE — 90715 TDAP VACCINE 7 YRS/> IM: CPT | Performed by: FAMILY MEDICINE

## 2020-07-09 PROCEDURE — 90472 IMMUNIZATION ADMIN EACH ADD: CPT | Performed by: FAMILY MEDICINE

## 2020-07-09 RX ORDER — ATORVASTATIN CALCIUM 40 MG/1
40 TABLET, FILM COATED ORAL NIGHTLY
Qty: 90 TABLET | Refills: 1 | Status: SHIPPED | OUTPATIENT
Start: 2020-07-09 | End: 2021-03-16

## 2020-07-09 NOTE — PROGRESS NOTES
Mikaela Black is a 60year old male who presents for a complete physical exam.   HPI:   Patient presents with: Well Adult:  Annual physical      His last annual assessment has been over 1 year: Annual Physical due on 04/02/2019       Pt complains of re 10/25/2019 02:04 PM     (L) 10/25/2019 02:04 PM    K 3.8 10/25/2019 02:04 PM     10/25/2019 02:04 PM    CO2 26.0 10/25/2019 02:04 PM    CREATSERUM 0.72 10/25/2019 02:04 PM    CA 9.1 10/25/2019 02:04 PM    ALB 3.5 10/25/2019 02:04 PM    TP 7.6 1 or dental problems  HEART:  No chest pain or palpitations  LUNG:  No SOB, cough or wheeze  GI:  No abdominal pain.   No N/V/D/C  :  No dysuria  MS:  No joint pain or swelling  NEURO:  Denies numbness or tingling  PSYCH:  No mood concerns or anxiety     EX Lymphadenopathy:     He has no cervical adenopathy. He has no axillary adenopathy. Neurological: He is alert and oriented to person, place, and time. He has normal strength and normal reflexes. No cranial nerve deficit or sensory deficit.    Skin: S hyperlipidemia    Overview     Elevated ASCD with tobacco hx. Rosuvastatin 10 stated 6/18/2018, switched to atorvastatin 40         Current Assessment & Plan     Stable, Continue present management.     Cholesterol Lowering Medications          atorvastatin

## 2020-08-12 RX ORDER — VARENICLINE TARTRATE 1 MG/1
TABLET, FILM COATED ORAL
Qty: 60 TABLET | Refills: 0 | Status: SHIPPED | OUTPATIENT
Start: 2020-08-12

## 2020-08-12 NOTE — TELEPHONE ENCOUNTER
Refill request for:    Requested Prescriptions     Pending Prescriptions Disp Refills   • CHANTIX 1 MG Oral Tab [Pharmacy Med Name: Chantix 1 Mg Tab Pfiz] 60 tablet 0     Sig: TAKE ONE TABLET BY MOUTH TWICE DAILY        Last Prescribed Quantity Refills   3

## 2020-10-08 ENCOUNTER — TELEPHONE (OUTPATIENT)
Dept: FAMILY MEDICINE CLINIC | Facility: CLINIC | Age: 65
End: 2020-10-08

## 2020-10-08 NOTE — TELEPHONE ENCOUNTER
Patient has appt 10/9/2020 for second dose of Shingrix. LOV 7/9/20 with Dr. Reba Saeed M.D. Last done given 7/9/2020.   Order pending for your review

## 2020-10-09 ENCOUNTER — NURSE ONLY (OUTPATIENT)
Dept: FAMILY MEDICINE CLINIC | Facility: CLINIC | Age: 65
End: 2020-10-09
Payer: COMMERCIAL

## 2020-10-09 VITALS — TEMPERATURE: 97 F

## 2020-10-09 DIAGNOSIS — Z23 NEED FOR SHINGLES VACCINE: Primary | ICD-10-CM

## 2020-10-09 PROCEDURE — 90750 HZV VACC RECOMBINANT IM: CPT | Performed by: FAMILY MEDICINE

## 2020-10-09 PROCEDURE — 90471 IMMUNIZATION ADMIN: CPT | Performed by: FAMILY MEDICINE

## 2020-10-09 NOTE — PROGRESS NOTES
Patient presents for his second dose of Shingrix. The Shingrix was ordered by Dr. Shelton Good M.D. He is given the VIS. He has signed a waiver. The Shingrix is administered into the left deltoid.    After sitting for a couple minutes the patient left the of

## 2021-01-26 ENCOUNTER — TELEPHONE (OUTPATIENT)
Dept: FAMILY MEDICINE CLINIC | Facility: CLINIC | Age: 66
End: 2021-01-26

## 2021-01-26 NOTE — TELEPHONE ENCOUNTER
Pt requested has moved and requested the medical authorization release forms. I mailed the medical authorization release forms to the pt's new home address in [place], [place].

## 2021-03-16 DIAGNOSIS — E78.2 MIXED HYPERLIPIDEMIA: ICD-10-CM

## 2021-03-16 RX ORDER — ATORVASTATIN CALCIUM 40 MG/1
TABLET, FILM COATED ORAL
Qty: 30 TABLET | Refills: 0 | Status: SHIPPED | OUTPATIENT
Start: 2021-03-16 | End: 2021-03-17

## 2021-03-16 NOTE — TELEPHONE ENCOUNTER
Requested Prescriptions     Pending Prescriptions Disp Refills   • ATORVASTATIN 40 MG Oral Tab [Pharmacy Med Name: Atorvastatin Calcium Oral Tablet 40 MG] 90 tablet 0     Sig: TAKE ONE TABLET BY MOUTH NIGHTLY     LOV 7/9/2020     Patient was asked to curtis

## 2021-03-17 RX ORDER — ATORVASTATIN CALCIUM 40 MG/1
40 TABLET, FILM COATED ORAL NIGHTLY
Qty: 90 TABLET | Refills: 0 | Status: SHIPPED | OUTPATIENT
Start: 2021-03-17

## 2021-03-17 NOTE — TELEPHONE ENCOUNTER
Called and talked to patient asked him if he has moved to [place] He said no but he has established care with another family practice doctor in [facility] had physical with them 01/27/2021 When I asked him about this he [place]

## 2021-03-17 NOTE — TELEPHONE ENCOUNTER
Please notify:    Requested Prescriptions     Signed Prescriptions Disp Refills   • atorvastatin 40 MG Oral Tab 90 tablet 0     Sig: Take 1 tablet (40 mg total) by mouth nightly.      Authorizing Provider: Rashmi Bose to:      [id number] -

## 2021-03-17 NOTE — TELEPHONE ENCOUNTER
Pt is not living in the state [place] right now so he will not schedule his physical until he returns.  I asked how he would get his medication because we can not prescribe over state lines and he said he will get it when he comes back in a week or so f

## 2021-04-12 DIAGNOSIS — Z23 NEED FOR VACCINATION: ICD-10-CM

## 2023-11-16 ENCOUNTER — HOSPITAL ENCOUNTER (EMERGENCY)
Facility: HOSPITAL | Age: 68
Discharge: HOME | End: 2023-11-16
Attending: STUDENT IN AN ORGANIZED HEALTH CARE EDUCATION/TRAINING PROGRAM
Payer: COMMERCIAL

## 2023-11-16 VITALS
HEIGHT: 69 IN | DIASTOLIC BLOOD PRESSURE: 78 MMHG | TEMPERATURE: 98.6 F | WEIGHT: 180 LBS | OXYGEN SATURATION: 97 % | SYSTOLIC BLOOD PRESSURE: 129 MMHG | RESPIRATION RATE: 20 BRPM | HEART RATE: 67 BPM | BODY MASS INDEX: 26.66 KG/M2

## 2023-11-16 DIAGNOSIS — S61.012A LACERATION OF LEFT THUMB, FOREIGN BODY PRESENCE UNSPECIFIED, NAIL DAMAGE STATUS UNSPECIFIED, INITIAL ENCOUNTER: Primary | ICD-10-CM

## 2023-11-16 PROCEDURE — 12001 RPR S/N/AX/GEN/TRNK 2.5CM/<: CPT

## 2023-11-16 PROCEDURE — 99284 EMERGENCY DEPT VISIT MOD MDM: CPT | Performed by: STUDENT IN AN ORGANIZED HEALTH CARE EDUCATION/TRAINING PROGRAM

## 2023-11-16 PROCEDURE — 2500000004 HC RX 250 GENERAL PHARMACY W/ HCPCS (ALT 636 FOR OP/ED)

## 2023-11-16 PROCEDURE — 12001 RPR S/N/AX/GEN/TRNK 2.5CM/<: CPT | Performed by: STUDENT IN AN ORGANIZED HEALTH CARE EDUCATION/TRAINING PROGRAM

## 2023-11-16 PROCEDURE — 99285 EMERGENCY DEPT VISIT HI MDM: CPT | Performed by: STUDENT IN AN ORGANIZED HEALTH CARE EDUCATION/TRAINING PROGRAM

## 2023-11-16 PROCEDURE — 99283 EMERGENCY DEPT VISIT LOW MDM: CPT | Mod: 25

## 2023-11-16 RX ORDER — SULFAMETHOXAZOLE AND TRIMETHOPRIM 800; 160 MG/1; MG/1
1 TABLET ORAL ONCE
Status: COMPLETED | OUTPATIENT
Start: 2023-11-16 | End: 2023-11-16

## 2023-11-16 RX ORDER — SULFAMETHOXAZOLE AND TRIMETHOPRIM 800; 160 MG/1; MG/1
1 TABLET ORAL 2 TIMES DAILY
Qty: 14 TABLET | Refills: 0 | Status: SHIPPED | OUTPATIENT
Start: 2023-11-16 | End: 2023-11-23

## 2023-11-16 RX ADMIN — SULFAMETHOXAZOLE AND TRIMETHOPRIM 1 TABLET: 800; 160 TABLET ORAL at 22:05

## 2023-11-16 ASSESSMENT — COLUMBIA-SUICIDE SEVERITY RATING SCALE - C-SSRS
6. HAVE YOU EVER DONE ANYTHING, STARTED TO DO ANYTHING, OR PREPARED TO DO ANYTHING TO END YOUR LIFE?: NO
2. HAVE YOU ACTUALLY HAD ANY THOUGHTS OF KILLING YOURSELF?: NO
1. IN THE PAST MONTH, HAVE YOU WISHED YOU WERE DEAD OR WISHED YOU COULD GO TO SLEEP AND NOT WAKE UP?: NO

## 2023-11-16 ASSESSMENT — PAIN DESCRIPTION - ORIENTATION: ORIENTATION: LEFT

## 2023-11-16 ASSESSMENT — PAIN DESCRIPTION - PAIN TYPE: TYPE: ACUTE PAIN

## 2023-11-16 ASSESSMENT — PAIN - FUNCTIONAL ASSESSMENT: PAIN_FUNCTIONAL_ASSESSMENT: 0-10

## 2023-11-16 ASSESSMENT — PAIN DESCRIPTION - LOCATION: LOCATION: FINGER (COMMENT WHICH ONE)

## 2023-11-16 ASSESSMENT — PAIN SCALES - GENERAL: PAINLEVEL_OUTOF10: 2

## 2023-11-16 ASSESSMENT — PAIN DESCRIPTION - FREQUENCY: FREQUENCY: CONSTANT/CONTINUOUS

## 2023-11-17 NOTE — ED PROVIDER NOTES
"EMERGENCY DEPARTMENT ENCOUNTER      Pt Name: Sergio Griggs  MRN: [id number]  Birthdate 1955  Date of evaluation: 11/16/2023  Provider: Addy Hawkins MD    CHIEF COMPLAINT       Chief Complaint   Patient presents with    Finger Laceration     Left thumb. Pt states he hit \"piece of metal\" in basement with hammer and \"it exploded\" and cut his left thumb. 1in lac present on thumb, bleeding controlled with 2x2 gauze and coban         HISTORY OF PRESENT ILLNESS    HPI  Patient is a 68-year-old male who reports to the ED with a chief complaint of a left thumb laceration.  Patient states he had a \"piece of metal\" in his basement with a hammer and exploded cut his left thumb.  He immediately pressure and reported to the ED for evaluation.  States that all his immunizations including tetanus is up-to-date.  No other concerns at this time.  Nursing Notes were reviewed.    PAST MEDICAL HISTORY   No past medical history on file.      SURGICAL HISTORY     No past surgical history on file.      CURRENT MEDICATIONS       Previous Medications    No medications on file       ALLERGIES     Augmentin [amoxicillin-pot clavulanate]    FAMILY HISTORY     No family history on file.       SOCIAL HISTORY       Social History     Socioeconomic History    Marital status:      Spouse name: Not on file    Number of children: Not on file    Years of education: Not on file    Highest education level: Not on file   Occupational History    Not on file   Tobacco Use    Smoking status: Not on file    Smokeless tobacco: Not on file   Substance and Sexual Activity    Alcohol use: Not on file    Drug use: Not on file    Sexual activity: Not on file   Other Topics Concern    Not on file   Social History Narrative    Not on file     Social Determinants of Health     Financial Resource Strain: Not on file   Food Insecurity: Not on file   Transportation Needs: Not on file   Physical Activity: Not on file   Stress: Not on file   Social " Connections: Not on file   Intimate Partner Violence: Not on file   Housing Stability: Not on file       SCREENINGS                        PHYSICAL EXAM    (up to 7 for level 4, 8 or more for level 5)     ED Triage Vitals [11/16/23 2050]   Temp Heart Rate Resp BP   37 °C (98.6 °F) 67 20 129/78      SpO2 Temp Source Heart Rate Source Patient Position   97 % Tympanic -- --      BP Location FiO2 (%)     -- --       REVIEW OF SYSTEMS:    CONSTITUTIONAL: Denies fever, chills.   NEURO: Denies headache.   HEENT: Denies changes in vision.   CARDIO: Denies chest pain, palpitations.  PULM: Denies shortness of breath.   GI: Denies abdominal pain, nausea, vomiting.  MSK: Reports recent trauma  SKIN: Reports lesion.         Physical Exam  Constitutional:       General: He is not in acute distress.  Cardiovascular:      Rate and Rhythm: Normal rate and regular rhythm.      Pulses: Normal pulses.      Heart sounds: Normal heart sounds. No murmur heard.     No gallop.   Pulmonary:      Effort: Pulmonary effort is normal.      Breath sounds: Normal breath sounds.   Musculoskeletal:      Cervical back: Neck supple.   Skin:     General: Skin is warm and dry.      Capillary Refill: Capillary refill takes less than 2 seconds.      Findings: Lesion present.      Comments: 1/2 cm lesion present at the base of his left thumb. Active bleeding noted but well controlled by 2x2 gauze.     Neurological:      Mental Status: He is alert and oriented to person, place, and time.          DIAGNOSTIC RESULTS     LABS:  Labs Reviewed - No data to display    All other labs were within normal range or not returned as of this dictation.    Imaging  No orders to display        Procedures  Procedures     EMERGENCY DEPARTMENT COURSE/MDM:     Diagnoses as of 11/16/23 2146   Laceration of left thumb, foreign body presence unspecified, nail damage status unspecified, initial encounter        Medical Decision Making  Patient is a 68-year-old male who reports to  the ED with a chief complaint of a left thumb laceration. Patient was otherwise well-appearing. Lesion had no evidence of foreign body presence. Dermabond was used to approximate the laceration edges and he was given a dose of bactrim for prophylaxis.  Patient was discharged with a prescription for Bactrim.  Patient instructed to return to the ED if he experiences any increasing swelling, tenderness or redness around the lesion.      Patient and or family in agreement and understanding of treatment plan.  All questions answered.      I reviewed the case with the attending ED physician. The attending ED physician agrees with the plan. Patient and/or patient´s representative was counseled regarding labs, imaging, likely diagnosis, and plan. All questions were answered.    ED Medications administered this visit:    Medications   sulfamethoxazole-trimethoprim (Bactrim DS) 800-160 mg per tablet 1 tablet (has no administration in time range)       New Prescriptions from this visit:    New Prescriptions    No medications on file       Follow-up:  No follow-up provider specified.      Final Impression:   1. Laceration of left thumb, foreign body presence unspecified, nail damage status unspecified, initial encounter          (Please note that portions of this note were completed with a voice recognition program.  Efforts were made to edit the dictations but occasionally words are mis-transcribed.)     Addy Hawkins MD  Resident  11/16/23 2157       Addy Hawkins MD  Resident  11/16/23 1989

## 2023-11-17 NOTE — DISCHARGE INSTRUCTIONS
Return to the ED if you experience an new or worsening redness, tenderness, or swelling around your wound.

## (undated) NOTE — ED AVS SNAPSHOT
Roxanne Tolbertdrickson   MRN: [id number]    Department:  [facility] Emergency Department   Date of Visit:  10/25/2019           Disclosure     Insurance plans vary and the physician(s) referred by the ER may not be covered by your plan.  Please contact tell this physician (or your personal doctor if your instructions are to return to your personal doctor) about any new or lasting problems. The primary care or specialist physician will see patients referred from the [facility] Emergency Department.  Yaya Clement

## (undated) NOTE — LETTER
PATIENT NAME: Teressa Butt   : 6/15/1955       Waiver      DATE: 2020     Dear Patient,    Thank you for choosing [facility] as your health care provider. Your physician has deemed the following medical service(s) necessary.   Betito Garcia